# Patient Record
Sex: MALE | Race: WHITE | NOT HISPANIC OR LATINO | Employment: STUDENT | URBAN - METROPOLITAN AREA
[De-identification: names, ages, dates, MRNs, and addresses within clinical notes are randomized per-mention and may not be internally consistent; named-entity substitution may affect disease eponyms.]

---

## 2021-01-27 ENCOUNTER — AMBULATORY - HEALTHEAST (OUTPATIENT)
Dept: CARDIOLOGY | Facility: HOSPITAL | Age: 26
End: 2021-01-27

## 2021-01-27 ENCOUNTER — RECORDS - HEALTHEAST (OUTPATIENT)
Dept: ADMINISTRATIVE | Facility: OTHER | Age: 26
End: 2021-01-27

## 2021-01-27 DIAGNOSIS — Z20.822 COVID-19 VIRUS TEST RESULT UNKNOWN: ICD-10-CM

## 2021-01-28 ENCOUNTER — HOSPITAL ENCOUNTER (OUTPATIENT)
Dept: CARDIOLOGY | Facility: HOSPITAL | Age: 26
Discharge: HOME | End: 2021-01-28
Attending: FAMILY MEDICINE

## 2021-01-28 ENCOUNTER — AMBULATORY - HEALTHEAST (OUTPATIENT)
Dept: LAB | Facility: HOSPITAL | Age: 26
End: 2021-01-28

## 2021-01-28 DIAGNOSIS — Z20.822 COVID-19 VIRUS TEST RESULT UNKNOWN: ICD-10-CM

## 2021-01-28 DIAGNOSIS — Z20.822 SUSPECTED COVID-19 VIRUS INFECTION: ICD-10-CM

## 2021-01-28 LAB
AORTIC ROOT: 2.8 CM
AORTIC VALVE MEAN VELOCITY: 92.4 CM/S
ATRIAL RATE - MUSE: 63 BPM
AV DIMENSIONLESS INDEX VTI: 0.9
AV MEAN GRADIENT: 4 MMHG
AV PEAK GRADIENT: 7.7 MMHG
AV VALVE AREA: 2.7 CM2
AV VELOCITY RATIO: 1.1
CV BLOOD PRESSURE: ABNORMAL MMHG
DIASTOLIC BLOOD PRESSURE - MUSE: NORMAL
DOP CALC AO PEAK VEL: 139 CM/S
DOP CALC AO VTI: 28.2 CM
DOP CALC LVOT AREA: 3.14 CM2
DOP CALC LVOT DIAMETER: 2 CM
DOP CALC LVOT PEAK VEL: 148 CM/S
DOP CALC LVOT STROKE VOLUME: 75.7 CM3
DOP CALCLVOT PEAK VEL VTI: 24.1 CM
EJECTION FRACTION: 67 % (ref 55–75)
FRACTIONAL SHORTENING: 34.8 % (ref 28–44)
INTERPRETATION ECG - MUSE: NORMAL
INTERVENTRICULAR SEPTUM IN END DIASTOLE: 1.2 CM (ref 0.6–1)
IVS/PW RATIO: 1.5
LA AREA 1: 15.3 CM2
LA AREA 2: 17.7 CM2
LEFT ATRIUM LENGTH: 4.1 CM
LEFT ATRIUM SIZE: 3.4 CM
LEFT ATRIUM TO AORTIC ROOT RATIO: 1.21 NO UNITS
LEFT ATRIUM VOLUME: 56.1 ML
LEFT VENTRICLE CARDIAC OUTPUT: 6.4 L/MIN
LEFT VENTRICLE DIASTOLIC VOLUME: 148 CM3 (ref 62–150)
LEFT VENTRICLE HEART RATE: 85 BPM
LEFT VENTRICLE SYSTOLIC VOLUME: 49.3 CM3 (ref 21–61)
LEFT VENTRICULAR INTERNAL DIMENSION IN DIASTOLE: 4.66 CM (ref 4.2–5.8)
LEFT VENTRICULAR INTERNAL DIMENSION IN SYSTOLE: 3.04 CM (ref 2.5–4)
LEFT VENTRICULAR MASS: 162.7 G
LEFT VENTRICULAR OUTFLOW TRACT MEAN GRADIENT: 4 MMHG
LEFT VENTRICULAR OUTFLOW TRACT MEAN VELOCITY: 81.4 CM/S
LEFT VENTRICULAR OUTFLOW TRACT PEAK GRADIENT: 9 MMHG
LEFT VENTRICULAR POSTERIOR WALL IN END DIASTOLE: 0.81 CM (ref 0.6–1)
MITRAL VALVE DECELERATION SLOPE: 4350 MM/S2
MITRAL VALVE E/A RATIO: 1.1
MITRAL VALVE PRESSURE HALF-TIME: 56 MS
MV AVERAGE E/E' RATIO: 5.7 CM/S
MV DECELERATION TIME: 190 MS
MV E'TISSUE VEL-LAT: 17.7 CM/S
MV E'TISSUE VEL-MED: 11.1 CM/S
MV LATERAL E/E' RATIO: 4.7
MV MEDIAL E/E' RATIO: 7.4
MV PEAK A VELOCITY: 77.1 CM/S
MV PEAK E VELOCITY: 82.6 CM/S
MV VALVE AREA PRESSURE 1/2 METHOD: 3.9 CM2
P AXIS - MUSE: 38 DEGREES
PR INTERVAL - MUSE: 134 MS
QRS DURATION - MUSE: 110 MS
QT - MUSE: 408 MS
QTC - MUSE: 417 MS
R AXIS - MUSE: 74 DEGREES
SYSTOLIC BLOOD PRESSURE - MUSE: NORMAL
T AXIS - MUSE: 27 DEGREES
TRICUSPID VALVE ANULAR PLANE SYSTOLIC EXCURSION: 2.2 CM
VENTRICULAR RATE- MUSE: 63 BPM

## 2021-01-29 LAB — ARUP MISCELLANEOUS TEST: NORMAL

## 2021-01-30 LAB
MISCELLANEOUS TEST DEPT. - HE HISTORICAL: NORMAL
PERFORMING LAB: NORMAL
SPECIMEN STATUS: NORMAL
TEST NAME: NORMAL

## 2021-02-16 ENCOUNTER — COMMUNICATION - HEALTHEAST (OUTPATIENT)
Dept: CARDIOLOGY | Facility: CLINIC | Age: 26
End: 2021-02-16

## 2021-02-17 ENCOUNTER — OFFICE VISIT - HEALTHEAST (OUTPATIENT)
Dept: CARDIOLOGY | Facility: CLINIC | Age: 26
End: 2021-02-17

## 2021-02-17 DIAGNOSIS — I45.10 INCOMPLETE RBBB: ICD-10-CM

## 2021-02-17 DIAGNOSIS — U07.1 COVID-19 VIRUS DETECTED: ICD-10-CM

## 2021-02-17 ASSESSMENT — MIFFLIN-ST. JEOR: SCORE: 2083.47

## 2021-02-18 ENCOUNTER — HOSPITAL ENCOUNTER (OUTPATIENT)
Dept: CARDIOLOGY | Facility: HOSPITAL | Age: 26
Discharge: HOME | End: 2021-02-18
Attending: INTERNAL MEDICINE

## 2021-02-23 ENCOUNTER — AMBULATORY - HEALTHEAST (OUTPATIENT)
Dept: CARDIOLOGY | Facility: CLINIC | Age: 26
End: 2021-02-23

## 2021-02-23 ENCOUNTER — COMMUNICATION - HEALTHEAST (OUTPATIENT)
Dept: CARDIOLOGY | Facility: CLINIC | Age: 26
End: 2021-02-23

## 2021-02-25 ENCOUNTER — COMMUNICATION - HEALTHEAST (OUTPATIENT)
Dept: CARDIOLOGY | Facility: CLINIC | Age: 26
End: 2021-02-25

## 2021-06-05 VITALS
DIASTOLIC BLOOD PRESSURE: 84 MMHG | BODY MASS INDEX: 30.03 KG/M2 | WEIGHT: 226.6 LBS | HEART RATE: 76 BPM | RESPIRATION RATE: 16 BRPM | HEIGHT: 73 IN | SYSTOLIC BLOOD PRESSURE: 124 MMHG

## 2021-06-15 NOTE — TELEPHONE ENCOUNTER
"Per Dr. Easley's 2-17-21 consult note:  \"We will call Foster with the results of the 24-hour Holter monitor, which I expect to be normal.  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 Easley - will forward records request to HIS to address once results reviewed.  mg  "

## 2021-06-15 NOTE — PATIENT INSTRUCTIONS - HE
Foster,    It was a pleasure to see you today at [facility], [place].    Your EKG shows a harmless slowing of electrical conduction called incomplete right bundle branch block. We will record your heart beats for 24 hours to prove that there are no issues to concern you. My nurse or I will call you with the results.    Provided that the Holter is normal, you may return to vigorous exercise including competitive football.    Please call us if you have any questions or concerns about your heart.      Miguel Easley M.D.  [facility] [place]

## 2021-06-15 NOTE — TELEPHONE ENCOUNTER
----- Message from STERLING Bullock sent at 2/22/2021 11:15 AM CST -----  Regarding: RANDI PATIENT  General phone call:    Caller: PATIENT    Primary cardiologist: RANDI    Detailed reason for call: PATIENT WOULD LIKE HOLTER RESULTS SENT -[phone] ATHLETIC DEPARTMENT. ALSO PATIENT WOULD LIKE A CALL WITH THE RESULTS.     Best phone number: [phone]    Best time to contact: ANY    Ok to leave a detailed message? YES    Device? NO    Additional Info:

## 2021-06-16 PROBLEM — U07.1 COVID-19 VIRUS DETECTED: Status: ACTIVE | Noted: 2021-01-15

## 2021-06-17 NOTE — TELEPHONE ENCOUNTER
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     DALLIN EASLEY MD, FACC

## 2021-06-26 ENCOUNTER — HEALTH MAINTENANCE LETTER (OUTPATIENT)
Age: 26
End: 2021-06-26

## 2021-06-30 NOTE — PROGRESS NOTES
Progress Notes by Gareth Easley MD at 2/17/2021 10:30 AM     Author: Gareth Easley MD Service: -- Author Type: Physician    Filed: 2/17/2021 11:34 AM Encounter Date: 2/17/2021 Status: Signed    : Gareth Easley MD (Physician)           Thank you, Dr. Toro Youngblood, for advising Hardy Rios to meet with me in consultation today at the [facility] to evaluate an abnormal EKG in the setting of recent COVID-19 virus infection.     Assessment:    1. Incomplete RBBB  Holter Monitor   2. COVID-19 virus detected         Discussion:    I counseled the patient that in general incomplete right bundle branch block is a harmless electrical conduction defect which is generally not associated with any other rhythm disorders or structural heart problems.  His echocardiogram, which is normal, has already excluded significant structural heart defects.  Since he is a competitive , I am recommending a Holter monitor, even though the yield is quite low, to provide further evidence that he is not at risk of having any clinically significant rhythm disorders.  The normal echocardiogram and normal troponin level provide satisfactory evidence that he has not suffered from Covid myocarditis.  In fact, the symptoms that he described below are more likely to be related to his vaccine than Covid infection as they occurred only in the timeframe after each of his vaccinations.    Plan:    1. We will call Hardy with the results of the 24-hour Holter monitor, which I expect to be normal.  Provided it is normal, he may return to vigorous exercise program and competitive college football.    An After Visit Summary was printed and given to the patient.    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  The following morning he had 4 hours of headaches and body chills.  He found out a few days later that his Covid test was positive.  When he had his second vaccine 3 weeks later he had headaches and body chills for a few days.  He did not experience fevers, sore throat, cough or any respiratory symptoms.    Prior to this he was working out for 100 minutes 5 times a week including strenuous weightlifting and running.  He has been taken off of his exercise program for the last few weeks because of the positive Covid test.  He hopes to return to his workout as soon as possible because he plans to play football for 2 more years for [employer] while studying prosthetics in graduate school.  His Covid work-up today has included an EKG, normal troponin assay (see comments below), and a normal echocardiogram, the report of which is copied below.    Hardy has no cardiovascular symptoms except for being aware of his heart rate speeding up and slowing down when he intentionally takes deep breaths.    There is no family history of cardiovascular disease, defibrillator or pacemaker implant, or unexplained sudden death.    Patient Active Problem List   Diagnosis   ? COVID-19 virus detected       Past Medical History:  History reviewed. No pertinent past medical history.    Past Surgical History:  Past Surgical History:   Procedure Laterality Date   ? NO PAST SURGERIES         Family History:  Family History   Problem Relation Age of Onset   ? No Medical Problems Mother    ? Obesity Father    ? No Medical Problems Sister        Social History:   reports that he has never smoked. He has never used smokeless tobacco. He reports current alcohol use of about 5.0 standard drinks of alcohol per week. He reports that he does not use drugs.  Social History     Socioeconomic History    ? Marital status: Single     Spouse name: Not on file   ? Number of children: 0   ? Years of education: 16   ? Highest education level: Not on file   Occupational History   ? Occupation: nutrition aide at [employer]     Employer: [employer]     Comment: since October, 2020   Social Needs   ? Financial resource strain: Not on file   ? Food insecurity     Worry: Not on file     Inability: Not on file   ? Transportation needs     Medical: Not on file     Non-medical: Not on file   Tobacco Use   ? Smoking status: Never Smoker   ? Smokeless tobacco: Never Used   Substance and Sexual Activity   ? Alcohol use: Yes     Alcohol/week: 5.0 standard drinks     Types: 5 Cans of beer per week   ? Drug use: Never   ? Sexual activity: Not on file   Lifestyle   ? Physical activity     Days per week: 5 days     Minutes per session: 100 min   ? Stress: Not on file   Relationships   ? Social connections     Talks on phone: Not on file     Gets together: Not on file     Attends Muslim service: Not on file     Active member of club or organization: Not on file     Attends meetings of clubs or organizations: Not on file     Relationship status: Not on file   ? Intimate partner violence     Fear of current or ex partner: Not on file     Emotionally abused: Not on file     Physically abused: Not on file     Forced sexual activity: Not on file   Other Topics Concern   ? Not on file   Social History Narrative    Parents are ; Foster lives with his mother in [place]. He will graduate from [employer] in May, 2021 with a degree in exercise science and would like to pursue grad school in orthopaedic prosthetics at [employer]. He plays outside linebacker on scholarship and would like to play during grad school as he has two years of scholarship left.        Medications:  No outpatient encounter medications on file as of 2/17/2021.     No facility-administered encounter medications on file as of  "2/17/2021.        Allergies:  Patient has no known allergies.    Review of Systems:     General: WNL  Eyes: WNL  Ears/Nose/Throat: WNL  Lungs: WNL  Heart: heart rate varies with inspiration and expiration  Stomach: WNL  Bladder: WNL  Muscle/Joints: WNL  Skin: WNL  Nervous System: WNL  Mental Health: WNL     Blood: WNL    Objective:     Physical Exam:  Wt Readings from Last 5 Encounters:   02/17/21 (!) 226 lb 9.6 oz (102.8 kg)      6' 1.43\" (1.865 m)  Body mass index is 29.55 kg/m .  /84 (Patient Site: Left Arm, Patient Position: Sitting, Cuff Size: Adult Large)   Pulse 76   Resp 16   Ht 6' 1.43\" (1.865 m)   Wt (!) 226 lb 9.6 oz (102.8 kg)   BMI 29.55 kg/m      General Appearance: Alert and not in distress   HEENT: No scleral icterus; the tongue was not examined as he is wearing a mask due to Covid restrictions   Neck: No cervical bruits, adenopathy, or thyromegaly; jugular venous pressure is less than 5 cm   Chest: The spine is straight and the chest is symmetric   Lungs: Respirations are unlabored; the lungs are clear to auscultation   Cardiovasular: Auscultation reveals normal first and second heart sounds and no murmurs, rubs, or gallops; the carotid, radial, femoral, and posterior tibial pulses are intact   Abdomen: No organomegaly, masses, bruits, or tenderness; bowel sounds are present   Extremities: No cyanosis, clubbing or edema   Skin: No xanthelasma   Neurologic: Mood and affect are appropriate       Cardiac testing:    EKG: Sinus rhythm with incomplete right bundle branch block per my personal review.    Echocardiogram:   Results for orders placed during the hospital encounter of 01/28/21   Echo Complete [ECH10] 01/28/2021    Narrative   No previous study for comparison.    Normal left ventricular size.    Left ventricle ejection fraction is normal. The calculated left   ventricular ejection fraction is 67%.    Normal right ventricular size and systolic function.    No significant valve " abnormality observed.          Imaging:    Echo Complete    Result Date: 1/28/2021    No previous study for comparison.   Normal left ventricular size.   Left ventricle ejection fraction is normal. The calculated left ventricular ejection fraction is 67%.   Normal right ventricular size and systolic function.   No significant valve abnormality observed.        Lab Review:    Troponin T was normal on January 28, 2021 per letter from clinical lab in [place] that I personally reviewed.          Much or all of the text in this note was generated through the use of the Dragon Dictate voice-to-text software. Errors in spelling or words which seem out of context are unintentional. Sound alike errors, in particular, may have escaped editing.

## 2021-08-18 ENCOUNTER — OFFICE VISIT (OUTPATIENT)
Dept: CARDIOLOGY | Facility: CLINIC | Age: 26
End: 2021-08-18
Payer: COMMERCIAL

## 2021-08-18 VITALS
DIASTOLIC BLOOD PRESSURE: 62 MMHG | RESPIRATION RATE: 16 BRPM | BODY MASS INDEX: 28.76 KG/M2 | HEIGHT: 73 IN | HEART RATE: 68 BPM | WEIGHT: 217 LBS | SYSTOLIC BLOOD PRESSURE: 110 MMHG

## 2021-08-18 DIAGNOSIS — M79.89 SWELLING OF LIMB: Primary | ICD-10-CM

## 2021-08-18 PROBLEM — I45.10 INCOMPLETE RBBB: Status: ACTIVE | Noted: 2021-08-18

## 2021-08-18 PROBLEM — U07.1 COVID-19 VIRUS DETECTED: Status: RESOLVED | Noted: 2021-01-15 | Resolved: 2021-08-18

## 2021-08-18 PROCEDURE — 99214 OFFICE O/P EST MOD 30 MIN: CPT | Performed by: INTERNAL MEDICINE

## 2021-08-18 ASSESSMENT — MIFFLIN-ST. JEOR: SCORE: 2025.88

## 2021-08-18 NOTE — PROGRESS NOTES
"      Assessment:    Problem List Items Addressed This Visit     None      Visit Diagnoses     Swelling of right lower leg    -  Primary           Plan:    1. I advised Hardy that I felt the swelling was related to his 2 prior injuries and thus is probably lymphatic in nature.  I do not think it is due to superficial thrombophlebitis and there is no clinical evidence of deep venous thrombosis.  Thus, I do not recommend ultrasound imaging of the leg at this time and Hardy is in agreement with this advice.  My recommendation to him was to keep the right leg up when possible and to consider using over-the-counter compression stockings.  If the swelling worsens or if he develops any leg discomfort or breathlessness he will report promptly for reevaluation.  His team physician, Dr. Youngblood, will be visiting practice today or tomorrow and I asked him to review my advice with Dr. Youngblood.    An After Visit Summary was printed and given to the patient.    Subjective:    Hardy Rios returned for an unplanned follow up visit that he requested to discuss the following concern.    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  Concomitant with this Hardy has noticed some focal pitting edema in the right lower anterior shin.  He previously had a laceration of this area several years ago when he was \"cleated\" and thus has a scar in the middle of the area of edema.  He states he also previously had swelling in this leg after a sprain of the right middle collateral ligament of the knee.  The swelling is better in the morning.  He has not had any redness, tenderness, shortness of breath, palpitations or lightheadedness.    There is no family history of blood clotting or deep venous thrombosis.    Past Medical History:    Patient Active Problem List   Diagnosis     Incomplete RBBB       Past Medical History:   Diagnosis Date     COVID-19 virus detected 1/15/2021 "    positive test done at [facility] six hours before his Covid  vaccine, which was done at [facility]; headache and body chills  for four hours the next morning; he had his second dose of vaccine three  weeks later and had worse headaches and chills after that      Incomplete RBBB 8/18/2021    with normal echocardiogram and unremarkable Holter monitor       Past Surgical History:   Procedure Laterality Date     NO PAST SURGERIES          reports that he has never smoked. He has never used smokeless tobacco. He reports current alcohol use of about 5.0 standard drinks of alcohol per week. He reports that he does not use drugs.  Social History     Socioeconomic History     Marital status: Single     Spouse name: Not on file     Number of children: 0     Years of education: 16     Highest education level: Not on file   Occupational History     Not on file   Tobacco Use     Smoking status: Never Smoker     Smokeless tobacco: Never Used   Substance and Sexual Activity     Alcohol use: Yes     Alcohol/week: 5.0 standard drinks     Drug use: Never     Sexual activity: Not on file   Other Topics Concern     Not on file   Social History Narrative    Parents are ; Foster lives with his mother in [place]. He will graduate from [employer] in May, 2021 with a degree in exercise science.  In the fall 2021 he states he will be starting the [employer] program at [facility] 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     Social Determinants of Health     Financial Resource Strain:      Difficulty of Paying Living Expenses:    Food Insecurity:      Worried About Running Out of Food in the Last Year:      Ran Out of Food in the Last Year:    Transportation Needs:      Lack of Transportation (Medical):      Lack of Transportation (Non-Medical):   "  Physical Activity:      Days of Exercise per Week:      Minutes of Exercise per Session:    Stress:      Feeling of Stress :    Social Connections:      Frequency of Communication with Friends and Family:      Frequency of Social Gatherings with Friends and Family:      Attends Religion Services:      Active Member of Clubs or Organizations:      Attends Club or Organization Meetings:      Marital Status:    Intimate Partner Violence:      Fear of Current or Ex-Partner:      Emotionally Abused:      Physically Abused:      Sexually Abused:        Family History   Problem Relation Age of Onset     No Known Problems Mother      Obesity Father      No Known Problems Sister      Heart Disease No family hx of      Sudden Death No family hx of      ICD - Implantable Cardioverter Defibrillator No family hx of      Pacemaker No family hx of      Deep Vein Thrombosis No family hx of      Clotting Disorder No family hx of        Outpatient Encounter Medications as of 8/18/2021   Medication Sig Dispense Refill     Creatine Monohydrate POWD Take 8 g by mouth daily       No facility-administered encounter medications on file as of 8/18/2021.       Review of Systems:     Please see the [facility] review of systems report, which I personally reviewed.    Objective:     /62 (BP Location: Left arm, Patient Position: Sitting, Cuff Size: Adult Large)   Pulse 68   Resp 16   Ht 1.843 m (6' 0.54\")   Wt 98.4 kg (217 lb)   BMI 28.99 kg/m    Wt Readings from Last 5 Encounters:   08/18/21 98.4 kg (217 lb)   02/17/21 102.8 kg (226 lb 9.6 oz)        Physical Exam:    GENERAL APPEARANCE: alert, no apparent distress  EYES: no scleral icterus  NECK: no carotid bruits or adenopathy, jugular venous pressure is less than 5 cm  CHEST: symmetric, the lungs are clear to auscultation  CARDIOVASCULAR: regular rhythm without murmur or gallop  EXTREMITIES: no cyanosis, clubbing; there is localized 1+ pitting edema on the anterior right lower leg " surrounding a prior scar from an athletic injury  SKIN: no xanthelasma  NEUROLOGIC: normal gait and coordination  PSYCHIATRIC: mood and affect are normal    Cardiac Testing:    Echo Complete  Order# [id number]  Reading physician: Braulio Fields MD Ordering physician: Toro Youngblood MD Study date: 21   Performing Date Performing Department   2021  CARDIAC TESTING [[id number]]   Patient Information    Patient Name  Hardy Rios MRN  [id number] Legal Sex  Male  1  1995 (25 y.o.)   Indications    Dx: COVID-19 virus test result unknown [Z20.822 (ICD-10-CM)]   Summary      No previous study for comparison.    Normal left ventricular size.    Left ventricle ejection fraction is normal. The calculated left ventricular ejection fraction is 67%.    Normal right ventricular size and systolic function.    No significant valve abnormality observed.        HOLTER REPORT     Results:    Indication for study: Evaluate for bundle branch block    24-hour Holter monitor is applied 2021 with date of interpretation 2021    Baseline tracing showed sinus rhythm with a IVCD    Average heart rate was 64 bpm with heart rate ranged from  bpm    Some nocturnal sinus bradycardia seen no long pauses ; and no AV block    No ventricular ectopy number      rare supraventricular ectopy:   there is some sinus arrhythmia; and the  computer over counts supraventricular ectopy.  Nearly all these reported ectopic  beats are actually a form of sinus arrhythmia    No complex atrial arrhythmias no atrial tachycardia atrial fibrillation    Patient activity diary was reviewed but no symptoms were noted          Impression:    Normal monitor with normal physiologic heart rate pattern including some nocturnal bradycardia although the computer notes a number of SVT, these are actually    Forms of sinus arrhythmia    Although some sinus bradycardia seen during sleeping hours, no abrupt bradycardia  pauses or AV block end of dictation        Comment: The recording was for 23 hrs and 59 min.  The recording quality was  adequate.    Imaging:    No results found.    Lab Results:    No results found.        Much or all of the text in this note was generated through the use of the Dragon Dictate voice-to-text software. Errors in spelling or words which seem out of context are unintentional. Sound alike errors, in particular, may have escaped editing.

## 2021-08-18 NOTE — LETTER
"8/18/2021    Toro Youngblood MD  [facility] [id number] [address] [address]    RE: Hardy GUAN Glenneliseo       Dear Colleague,    I had the pleasure of seeing Hardy Rios in the [facility].          Assessment:    Problem List Items Addressed This Visit     None      Visit Diagnoses     Swelling of right lower leg    -  Primary           Plan:    1. I advised Hardy that I felt the swelling was related to his 2 prior injuries and thus is probably lymphatic in nature.  I do not think it is due to superficial thrombophlebitis and there is no clinical evidence of deep venous thrombosis.  Thus, I do not recommend ultrasound imaging of the leg at this time and Hardy is in agreement with this advice.  My recommendation to him was to keep the right leg up when possible and to consider using over-the-counter compression stockings.  If the swelling worsens or if he develops any leg discomfort or breathlessness he will report promptly for reevaluation.  His team physician, Dr. Youngblood, will be visiting practice today or tomorrow and I asked him to review my advice with Dr. Youngblood.    An After Visit Summary was printed and given to the patient.    Subjective:    Hardy Rios returned for an unplanned follow up visit that he requested to discuss the following concern.    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  Concomitant with this Hardy has noticed some focal pitting edema in the right lower anterior shin.  He previously had a laceration of this area several years ago when he was \"cleated\" and thus has a scar in the middle of the area of edema.  He states he also previously had swelling in this leg after a sprain of the right middle collateral ligament of the knee.  The swelling is better in the morning.  He has not had any redness, tenderness, shortness of breath, " palpitations or lightheadedness.    There is no family history of blood clotting or deep venous thrombosis.    Past Medical History:    Patient Active Problem List   Diagnosis     Incomplete RBBB       Past Medical History:   Diagnosis Date     COVID-19 virus detected 1/15/2021    positive test done at [facility] six hours before his Covid  vaccine, which was done at [facility]; headache and body chills  for four hours the next morning; he had his second dose of vaccine three  weeks later and had worse headaches and chills after that      Incomplete RBBB 8/18/2021    with normal echocardiogram and unremarkable Holter monitor       Past Surgical History:   Procedure Laterality Date     NO PAST SURGERIES          reports that he has never smoked. He has never used smokeless tobacco. He reports current alcohol use of about 5.0 standard drinks of alcohol per week. He reports that he does not use drugs.  Social History     Socioeconomic History     Marital status: Single     Spouse name: Not on file     Number of children: 0     Years of education: 16     Highest education level: Not on file   Occupational History     Not on file   Tobacco Use     Smoking status: Never Smoker     Smokeless tobacco: Never Used   Substance and Sexual Activity     Alcohol use: Yes     Alcohol/week: 5.0 standard drinks     Drug use: Never     Sexual activity: Not on file   Other Topics Concern     Not on file   Social History Narrative    Parents are ; Foster lives with his mother in [place]. He will graduate from [employer] in May, 2021 with a degree in exercise science.  In the fall 2021 he states he will be starting the [employer] program at [employer]. 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     Social Determinants of Health     Financial  "Resource Strain:      Difficulty of Paying Living Expenses:    Food Insecurity:      Worried About Running Out of Food in the Last Year:      Ran Out of Food in the Last Year:    Transportation Needs:      Lack of Transportation (Medical):      Lack of Transportation (Non-Medical):    Physical Activity:      Days of Exercise per Week:      Minutes of Exercise per Session:    Stress:      Feeling of Stress :    Social Connections:      Frequency of Communication with Friends and Family:      Frequency of Social Gatherings with Friends and Family:      Attends Yazidi Services:      Active Member of Clubs or Organizations:      Attends Club or Organization Meetings:      Marital Status:    Intimate Partner Violence:      Fear of Current or Ex-Partner:      Emotionally Abused:      Physically Abused:      Sexually Abused:        Family History   Problem Relation Age of Onset     No Known Problems Mother      Obesity Father      No Known Problems Sister      Heart Disease No family hx of      Sudden Death No family hx of      ICD - Implantable Cardioverter Defibrillator No family hx of      Pacemaker No family hx of      Deep Vein Thrombosis No family hx of      Clotting Disorder No family hx of        Outpatient Encounter Medications as of 8/18/2021   Medication Sig Dispense Refill     Creatine Monohydrate POWD Take 8 g by mouth daily       No facility-administered encounter medications on file as of 8/18/2021.       Review of Systems:     Please see the [facility] review of systems report, which I personally reviewed.    Objective:     /62 (BP Location: Left arm, Patient Position: Sitting, Cuff Size: Adult Large)   Pulse 68   Resp 16   Ht 1.843 m (6' 0.54\")   Wt 98.4 kg (217 lb)   BMI 28.99 kg/m    Wt Readings from Last 5 Encounters:   08/18/21 98.4 kg (217 lb)   02/17/21 102.8 kg (226 lb 9.6 oz)        Physical Exam:    GENERAL APPEARANCE: alert, no apparent distress  EYES: no scleral icterus  NECK: no carotid " bruits or adenopathy, jugular venous pressure is less than 5 cm  CHEST: symmetric, the lungs are clear to auscultation  CARDIOVASCULAR: regular rhythm without murmur or gallop  EXTREMITIES: no cyanosis, clubbing; there is localized 1+ pitting edema on the anterior right lower leg surrounding a prior scar from an athletic injury  SKIN: no xanthelasma  NEUROLOGIC: normal gait and coordination  PSYCHIATRIC: mood and affect are normal    Cardiac Testing:    Echo Complete  Order# [id number]  Reading physician: Braulio Fields MD Ordering physician: Toro Youngblood MD Study date: 21   Performing Date Performing Department   2021  CARDIAC TESTING [[id number]]   Patient Information    Patient Name  Hardy Rios MRN  [id number] Legal Sex  Male  1  1995 (25 y.o.)   Indications    Dx: COVID-19 virus test result unknown [Z20.822 (ICD-10-CM)]   Summary      No previous study for comparison.    Normal left ventricular size.    Left ventricle ejection fraction is normal. The calculated left ventricular ejection fraction is 67%.    Normal right ventricular size and systolic function.    No significant valve abnormality observed.        HOLTER REPORT     Results:    Indication for study: Evaluate for bundle branch block    24-hour Holter monitor is applied 2021 with date of interpretation 2021    Baseline tracing showed sinus rhythm with a IVCD    Average heart rate was 64 bpm with heart rate ranged from  bpm    Some nocturnal sinus bradycardia seen no long pauses ; and no AV block    No ventricular ectopy number      rare supraventricular ectopy:   there is some sinus arrhythmia; and the  computer over counts supraventricular ectopy.  Nearly all these reported ectopic  beats are actually a form of sinus arrhythmia    No complex atrial arrhythmias no atrial tachycardia atrial fibrillation    Patient activity diary was reviewed but no symptoms were  noted          Impression:    Normal monitor with normal physiologic heart rate pattern including some nocturnal bradycardia although the computer notes a number of SVT, these are actually    Forms of sinus arrhythmia    Although some sinus bradycardia seen during sleeping hours, no abrupt bradycardia pauses or AV block end of dictation        Comment: The recording was for 23 hrs and 59 min.  The recording quality was  adequate.    Imaging:    No results found.    Lab Results:    No results found.        Much or all of the text in this note was generated through the use of the Dragon Dictate voice-to-text software. Errors in spelling or words which seem out of context are unintentional. Sound alike errors, in particular, may have escaped editing.          Thank you for allowing me to participate in the care of your patient.      Sincerely,     Gareth Easley MD     [facility]  cc:   Toro Youngblood MD  [address] [facility] [place] [address]

## 2021-08-18 NOTE — PATIENT INSTRUCTIONS
Foster,    It was a pleasure to see you today at [facility].    I believe that your swelling is related to your prior injuries and current intensive training. I would suggest keeping the leg up when possible and consider using over the counter compression stockings.    Please call us if you have any questions or concerns about your heart.      Miguel Easley M.D.  [facility]

## 2021-10-11 ENCOUNTER — HEALTH MAINTENANCE LETTER (OUTPATIENT)
Age: 26
End: 2021-10-11

## 2022-07-17 ENCOUNTER — HEALTH MAINTENANCE LETTER (OUTPATIENT)
Age: 27
End: 2022-07-17

## 2022-09-25 ENCOUNTER — HEALTH MAINTENANCE LETTER (OUTPATIENT)
Age: 27
End: 2022-09-25

## 2023-08-05 ENCOUNTER — HEALTH MAINTENANCE LETTER (OUTPATIENT)
Age: 28
End: 2023-08-05